# Patient Record
Sex: FEMALE | Race: WHITE | URBAN - METROPOLITAN AREA
[De-identification: names, ages, dates, MRNs, and addresses within clinical notes are randomized per-mention and may not be internally consistent; named-entity substitution may affect disease eponyms.]

---

## 2017-01-03 VITALS
RESPIRATION RATE: 18 BRPM | BODY MASS INDEX: 39.34 KG/M2 | HEIGHT: 63 IN | WEIGHT: 222 LBS | DIASTOLIC BLOOD PRESSURE: 80 MMHG | SYSTOLIC BLOOD PRESSURE: 150 MMHG | HEART RATE: 79 BPM | TEMPERATURE: 98.1 F | OXYGEN SATURATION: 95 %

## 2017-01-09 VITALS
HEIGHT: 63 IN | WEIGHT: 216 LBS | DIASTOLIC BLOOD PRESSURE: 70 MMHG | SYSTOLIC BLOOD PRESSURE: 130 MMHG | BODY MASS INDEX: 38.27 KG/M2 | OXYGEN SATURATION: 98 % | HEART RATE: 78 BPM | TEMPERATURE: 97.7 F | RESPIRATION RATE: 16 BRPM

## 2017-01-11 ENCOUNTER — OUTPATIENT (OUTPATIENT)
Dept: OUTPATIENT SERVICES | Facility: HOSPITAL | Age: 72
LOS: 1 days | Discharge: HOME | End: 2017-01-11
Payer: MEDICARE

## 2017-01-11 DIAGNOSIS — Z98.89 OTHER SPECIFIED POSTPROCEDURAL STATES: Chronic | ICD-10-CM

## 2017-01-11 DIAGNOSIS — N83.29 OTHER OVARIAN CYSTS: Chronic | ICD-10-CM

## 2017-01-11 PROCEDURE — 77280 THER RAD SIMULAJ FIELD SMPL: CPT | Mod: 26

## 2017-01-12 PROCEDURE — 77427 RADIATION TX MANAGEMENT X5: CPT

## 2017-01-13 PROCEDURE — 77331 SPECIAL RADIATION DOSIMETRY: CPT | Mod: 26

## 2017-01-13 PROCEDURE — 77427 RADIATION TX MANAGEMENT X5: CPT

## 2017-01-17 VITALS
BODY MASS INDEX: 39.34 KG/M2 | DIASTOLIC BLOOD PRESSURE: 83 MMHG | SYSTOLIC BLOOD PRESSURE: 143 MMHG | WEIGHT: 222 LBS | HEIGHT: 63 IN | RESPIRATION RATE: 16 BRPM | HEART RATE: 70 BPM | TEMPERATURE: 97.4 F

## 2017-01-17 VITALS — OXYGEN SATURATION: 98 %

## 2017-01-20 PROCEDURE — 77427 RADIATION TX MANAGEMENT X5: CPT

## 2017-01-24 ENCOUNTER — APPOINTMENT (OUTPATIENT)
Dept: INTERNAL MEDICINE | Facility: CLINIC | Age: 72
End: 2017-01-24

## 2017-01-26 VITALS
OXYGEN SATURATION: 98 % | DIASTOLIC BLOOD PRESSURE: 92 MMHG | HEART RATE: 70 BPM | SYSTOLIC BLOOD PRESSURE: 148 MMHG | RESPIRATION RATE: 16 BRPM

## 2017-03-02 ENCOUNTER — FORM ENCOUNTER (OUTPATIENT)
Age: 72
End: 2017-03-02

## 2017-03-03 ENCOUNTER — APPOINTMENT (OUTPATIENT)
Dept: RADIATION ONCOLOGY | Facility: CLINIC | Age: 72
End: 2017-03-03

## 2017-03-03 VITALS
OXYGEN SATURATION: 98 % | BODY MASS INDEX: 38.62 KG/M2 | WEIGHT: 218 LBS | DIASTOLIC BLOOD PRESSURE: 82 MMHG | HEART RATE: 84 BPM | HEIGHT: 63 IN | TEMPERATURE: 97.7 F | SYSTOLIC BLOOD PRESSURE: 158 MMHG | RESPIRATION RATE: 18 BRPM

## 2017-03-03 DIAGNOSIS — C51.9 MALIGNANT NEOPLASM OF VULVA, UNSPECIFIED: ICD-10-CM

## 2017-03-03 DIAGNOSIS — C80.1 MALIGNANT (PRIMARY) NEOPLASM, UNSPECIFIED: ICD-10-CM

## 2017-03-30 ENCOUNTER — APPOINTMENT (OUTPATIENT)
Dept: INTERNAL MEDICINE | Facility: CLINIC | Age: 72
End: 2017-03-30

## 2017-05-19 ENCOUNTER — APPOINTMENT (OUTPATIENT)
Dept: INTERNAL MEDICINE | Facility: CLINIC | Age: 72
End: 2017-05-19

## 2017-06-21 ENCOUNTER — APPOINTMENT (OUTPATIENT)
Dept: INTERNAL MEDICINE | Facility: CLINIC | Age: 72
End: 2017-06-21

## 2017-07-07 ENCOUNTER — APPOINTMENT (OUTPATIENT)
Dept: RADIATION ONCOLOGY | Facility: CLINIC | Age: 72
End: 2017-07-07

## 2017-07-24 ENCOUNTER — APPOINTMENT (OUTPATIENT)
Dept: INTERNAL MEDICINE | Facility: CLINIC | Age: 72
End: 2017-07-24

## 2017-08-01 ENCOUNTER — APPOINTMENT (OUTPATIENT)
Dept: INTERNAL MEDICINE | Facility: CLINIC | Age: 72
End: 2017-08-01
Payer: MEDICARE

## 2017-08-01 PROCEDURE — 36415 COLL VENOUS BLD VENIPUNCTURE: CPT

## 2017-08-01 PROCEDURE — 99214 OFFICE O/P EST MOD 30 MIN: CPT | Mod: 25

## 2017-08-08 ENCOUNTER — APPOINTMENT (OUTPATIENT)
Dept: INTERNAL MEDICINE | Facility: CLINIC | Age: 72
End: 2017-08-08
Payer: MEDICARE

## 2017-08-08 PROCEDURE — 36415 COLL VENOUS BLD VENIPUNCTURE: CPT

## 2017-08-08 PROCEDURE — 99214 OFFICE O/P EST MOD 30 MIN: CPT | Mod: 25

## 2017-09-05 ENCOUNTER — APPOINTMENT (OUTPATIENT)
Dept: INTERNAL MEDICINE | Facility: CLINIC | Age: 72
End: 2017-09-05
Payer: MEDICARE

## 2017-09-05 PROCEDURE — 36415 COLL VENOUS BLD VENIPUNCTURE: CPT

## 2017-09-05 PROCEDURE — G0008: CPT

## 2017-09-05 PROCEDURE — 90662 IIV NO PRSV INCREASED AG IM: CPT

## 2017-09-05 PROCEDURE — 99215 OFFICE O/P EST HI 40 MIN: CPT | Mod: 25

## 2017-09-14 ENCOUNTER — APPOINTMENT (OUTPATIENT)
Dept: INTERNAL MEDICINE | Facility: CLINIC | Age: 72
End: 2017-09-14
Payer: MEDICARE

## 2017-09-14 PROCEDURE — 99213 OFFICE O/P EST LOW 20 MIN: CPT

## 2017-09-20 ENCOUNTER — APPOINTMENT (OUTPATIENT)
Dept: INTERNAL MEDICINE | Facility: CLINIC | Age: 72
End: 2017-09-20
Payer: MEDICARE

## 2017-09-20 PROCEDURE — 99214 OFFICE O/P EST MOD 30 MIN: CPT | Mod: 25

## 2017-09-20 PROCEDURE — 36415 COLL VENOUS BLD VENIPUNCTURE: CPT

## 2017-11-08 ENCOUNTER — APPOINTMENT (OUTPATIENT)
Dept: INTERNAL MEDICINE | Facility: CLINIC | Age: 72
End: 2017-11-08
Payer: MEDICARE

## 2017-11-08 PROCEDURE — 36415 COLL VENOUS BLD VENIPUNCTURE: CPT

## 2017-11-08 PROCEDURE — 99214 OFFICE O/P EST MOD 30 MIN: CPT | Mod: 25

## 2017-11-28 ENCOUNTER — FORM ENCOUNTER (OUTPATIENT)
Age: 72
End: 2017-11-28

## 2017-11-29 ENCOUNTER — FORM ENCOUNTER (OUTPATIENT)
Age: 72
End: 2017-11-29

## 2017-11-30 ENCOUNTER — FORM ENCOUNTER (OUTPATIENT)
Age: 72
End: 2017-11-30

## 2017-11-30 ENCOUNTER — RESULT REVIEW (OUTPATIENT)
Age: 72
End: 2017-11-30

## 2017-12-07 ENCOUNTER — FORM ENCOUNTER (OUTPATIENT)
Age: 72
End: 2017-12-07

## 2018-01-15 ENCOUNTER — APPOINTMENT (OUTPATIENT)
Dept: INTERNAL MEDICINE | Facility: CLINIC | Age: 73
End: 2018-01-15
Payer: MEDICARE

## 2018-01-15 PROCEDURE — 99215 OFFICE O/P EST HI 40 MIN: CPT | Mod: 25

## 2018-01-15 PROCEDURE — 36415 COLL VENOUS BLD VENIPUNCTURE: CPT

## 2018-06-20 ENCOUNTER — FORM ENCOUNTER (OUTPATIENT)
Age: 73
End: 2018-06-20

## 2018-07-05 ENCOUNTER — FORM ENCOUNTER (OUTPATIENT)
Age: 73
End: 2018-07-05

## 2018-07-12 ENCOUNTER — RECORD ABSTRACTING (OUTPATIENT)
Age: 73
End: 2018-07-12

## 2018-07-12 DIAGNOSIS — Z87.19 PERSONAL HISTORY OF OTHER DISEASES OF THE DIGESTIVE SYSTEM: ICD-10-CM

## 2018-07-12 DIAGNOSIS — R29.898 OTHER SYMPTOMS AND SIGNS INVOLVING THE MUSCULOSKELETAL SYSTEM: ICD-10-CM

## 2018-07-12 DIAGNOSIS — Z86.39 PERSONAL HISTORY OF OTHER ENDOCRINE, NUTRITIONAL AND METABOLIC DISEASE: ICD-10-CM

## 2018-07-12 DIAGNOSIS — M87.052 IDIOPATHIC ASEPTIC NECROSIS OF LEFT FEMUR: ICD-10-CM

## 2018-07-12 DIAGNOSIS — E11.69 TYPE 2 DIABETES MELLITUS WITH OTHER SPECIFIED COMPLICATION: ICD-10-CM

## 2018-07-12 DIAGNOSIS — Z86.79 PERSONAL HISTORY OF OTHER DISEASES OF THE CIRCULATORY SYSTEM: ICD-10-CM

## 2018-07-12 DIAGNOSIS — Z87.39 PERSONAL HISTORY OF OTHER DISEASES OF THE MUSCULOSKELETAL SYSTEM AND CONNECTIVE TISSUE: ICD-10-CM

## 2018-07-12 DIAGNOSIS — R55 SYNCOPE AND COLLAPSE: ICD-10-CM

## 2018-07-12 DIAGNOSIS — E66.9 OBESITY, UNSPECIFIED: ICD-10-CM

## 2018-07-12 DIAGNOSIS — Z98.890 OTHER SPECIFIED POSTPROCEDURAL STATES: ICD-10-CM

## 2018-07-12 DIAGNOSIS — R26.89 OTHER ABNORMALITIES OF GAIT AND MOBILITY: ICD-10-CM

## 2018-07-12 RX ORDER — ASPIRIN 325 MG/1
325 TABLET, COATED ORAL DAILY
Refills: 0 | Status: ACTIVE | COMMUNITY

## 2018-07-12 RX ORDER — LEVETIRACETAM 750 MG/1
750 TABLET, FILM COATED ORAL TWICE DAILY
Refills: 0 | Status: ACTIVE | COMMUNITY

## 2018-07-12 RX ORDER — TEMOZOLOMIDE 100 MG/1
100 CAPSULE ORAL
Refills: 0 | Status: ACTIVE | COMMUNITY

## 2018-07-12 RX ORDER — TEMOZOLOMIDE 180 MG/1
180 CAPSULE ORAL
Refills: 0 | Status: ACTIVE | COMMUNITY

## 2018-07-24 ENCOUNTER — APPOINTMENT (OUTPATIENT)
Dept: INTERNAL MEDICINE | Facility: CLINIC | Age: 73
End: 2018-07-24
Payer: MEDICARE

## 2018-07-24 ENCOUNTER — LABORATORY RESULT (OUTPATIENT)
Age: 73
End: 2018-07-24

## 2018-07-24 VITALS
BODY MASS INDEX: 30.65 KG/M2 | SYSTOLIC BLOOD PRESSURE: 142 MMHG | DIASTOLIC BLOOD PRESSURE: 78 MMHG | HEIGHT: 63 IN | WEIGHT: 173 LBS

## 2018-07-24 PROCEDURE — 36415 COLL VENOUS BLD VENIPUNCTURE: CPT

## 2018-07-24 PROCEDURE — 99214 OFFICE O/P EST MOD 30 MIN: CPT | Mod: 25

## 2018-07-24 RX ORDER — ONDANSETRON 8 MG/1
8 TABLET ORAL
Refills: 0 | Status: ACTIVE | COMMUNITY

## 2018-07-24 RX ORDER — DULOXETINE HYDROCHLORIDE 60 MG/1
60 CAPSULE, DELAYED RELEASE ORAL
Refills: 0 | Status: DISCONTINUED | COMMUNITY
End: 2018-07-24

## 2018-07-24 RX ORDER — ONDANSETRON 8 MG/1
8 TABLET ORAL
Refills: 0 | Status: DISCONTINUED | COMMUNITY
End: 2018-07-24

## 2018-07-24 NOTE — HISTORY OF PRESENT ILLNESS
[Spouse] : spouse [FreeTextEntry1] : checking blood pressure...it was high last week\par glioblastoma\par dm f/up [de-identified] : Patient is status post whole brain radiation for glioblastoma. She is currently completing her last course of chemotherapy and is doing well. Her  states that her memory is improving and overall she is feeling well.\par \par She has not had any routine labs for her diabetes but her medications were reviewed and she is still on all medications. She denies any headaches nausea or vomiting. 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

## 2018-07-24 NOTE — PHYSICAL EXAM
[No Acute Distress] : no acute distress [Well Nourished] : well nourished [Well Developed] : well developed [Well-Appearing] : well-appearing [Normal Sclera/Conjunctiva] : normal sclera/conjunctiva [PERRL] : pupils equal round and reactive to light [EOMI] : extraocular movements intact [Normal Outer Ear/Nose] : the outer ears and nose were normal in appearance [Normal Oropharynx] : the oropharynx was normal [No JVD] : no jugular venous distention [Supple] : supple [No Lymphadenopathy] : no lymphadenopathy [Thyroid Normal, No Nodules] : the thyroid was normal and there were no nodules present [No Respiratory Distress] : no respiratory distress  [Clear to Auscultation] : lungs were clear to auscultation bilaterally [No Accessory Muscle Use] : no accessory muscle use [Regular Rhythm] : with a regular rhythm [Normal S1, S2] : normal S1 and S2 [No Murmur] : no murmur heard [No Carotid Bruits] : no carotid bruits [No Abdominal Bruit] : a ~M bruit was not heard ~T in the abdomen [No Varicosities] : no varicosities [Pedal Pulses Present] : the pedal pulses are present [No Edema] : there was no peripheral edema [No Extremity Clubbing/Cyanosis] : no extremity clubbing/cyanosis [No Palpable Aorta] : no palpable aorta [Soft] : abdomen soft [Non Tender] : non-tender [Non-distended] : non-distended [No Masses] : no abdominal mass palpated [No HSM] : no HSM [Normal Bowel Sounds] : normal bowel sounds [Normal Posterior Cervical Nodes] : no posterior cervical lymphadenopathy [Normal Anterior Cervical Nodes] : no anterior cervical lymphadenopathy [No CVA Tenderness] : no CVA  tenderness [No Spinal Tenderness] : no spinal tenderness [No Joint Swelling] : no joint swelling [Grossly Normal Strength/Tone] : grossly normal strength/tone [No Rash] : no rash [Normal Gait] : normal gait [Coordination Grossly Intact] : coordination grossly intact [No Focal Deficits] : no focal deficits [Deep Tendon Reflexes (DTR)] : deep tendon reflexes were 2+ and symmetric [Normal] : the deep tendon reflexes were normal [Short Term Intact] : short term memory impaired [Span Intact] : the attention span was normal [Normal Rate] : a normal rate

## 2018-07-24 NOTE — COUNSELING
[Weight management counseling provided] : Weight management [Behavioral health counseling provided] : behavioral health

## 2018-07-24 NOTE — REVIEW OF SYSTEMS
[Headache] : headache [Dizziness] : dizziness [Confusion] : confusion [Memory Loss] : memory loss [Negative] : Heme/Lymph [FreeTextEntry2] : poor memory but better

## 2018-07-24 NOTE — ASSESSMENT
[FreeTextEntry1] : Patient examined and adjustments to therapy for HBP [default value]All labs reviewed with patient as well. Review of systems and physical exam discussed with patient. Care plan for future followups discussed with patient. All issues of health for the current year discussed in depth with patient who was conversant and all relevant issues addressed.\par Results of current evaluation discussed with patient. Medications were reviewed and all relevant labs as well as a 1C level and glucose levels were discussed in depth with with patient. Further options for ideal control were discussed, long-term complications of diabetes and screening for complications were also discussed. Patient was conversant and all relevant questions were asked and answered.\par Patient significantly improved since last visit with improved memory improved short-term memory and more alert. Hopefully she will be in long-term remission from her glioblastoma. Routine laboratory data was obtained

## 2018-07-25 ENCOUNTER — RX CHANGE (OUTPATIENT)
Age: 73
End: 2018-07-25

## 2018-07-25 LAB
ALBUMIN SERPL ELPH-MCNC: 4.5 G/DL
ALP BLD-CCNC: 63 U/L
ALT SERPL-CCNC: 11 U/L
ANION GAP SERPL CALC-SCNC: 18 MMOL/L
AST SERPL-CCNC: 16 U/L
BASOPHILS # BLD AUTO: 0.01 K/UL
BASOPHILS NFR BLD AUTO: 0.2 %
BILIRUB SERPL-MCNC: 0.3 MG/DL
BUN SERPL-MCNC: 28 MG/DL
CALCIUM SERPL-MCNC: 10 MG/DL
CHLORIDE SERPL-SCNC: 106 MMOL/L
CHOLEST SERPL-MCNC: 259 MG/DL
CHOLEST/HDLC SERPL: 3.8 RATIO
CO2 SERPL-SCNC: 22 MMOL/L
CREAT SERPL-MCNC: 1.13 MG/DL
EOSINOPHIL # BLD AUTO: 0.13 K/UL
EOSINOPHIL NFR BLD AUTO: 2.7 %
GLUCOSE SERPL-MCNC: 90 MG/DL
HBA1C MFR BLD HPLC: 5.3 %
HCT VFR BLD CALC: 31.5 %
HDLC SERPL-MCNC: 69 MG/DL
HGB BLD-MCNC: 9.7 G/DL
IMM GRANULOCYTES NFR BLD AUTO: 0.2 %
LDLC SERPL CALC-MCNC: 155 MG/DL
LYMPHOCYTES # BLD AUTO: 0.44 K/UL
LYMPHOCYTES NFR BLD AUTO: 9.3 %
MAN DIFF?: NORMAL
MCHC RBC-ENTMCNC: 30 PG
MCHC RBC-ENTMCNC: 30.8 GM/DL
MCV RBC AUTO: 97.5 FL
MONOCYTES # BLD AUTO: 0.35 K/UL
MONOCYTES NFR BLD AUTO: 7.4 %
NEUTROPHILS # BLD AUTO: 3.81 K/UL
NEUTROPHILS NFR BLD AUTO: 80.2 %
PLATELET # BLD AUTO: 223 K/UL
POTASSIUM SERPL-SCNC: 4.6 MMOL/L
PROT SERPL-MCNC: 6.8 G/DL
RBC # BLD: 3.23 M/UL
RBC # FLD: 14.4 %
SODIUM SERPL-SCNC: 146 MMOL/L
TRIGL SERPL-MCNC: 174 MG/DL
WBC # FLD AUTO: 4.75 K/UL

## 2018-07-25 RX ORDER — METFORMIN HYDROCHLORIDE 500 MG/1
500 TABLET, COATED ORAL DAILY
Qty: 90 | Refills: 1 | Status: ACTIVE | COMMUNITY
Start: 1900-01-01 | End: 1900-01-01

## 2018-08-15 ENCOUNTER — MEDICATION RENEWAL (OUTPATIENT)
Age: 73
End: 2018-08-15

## 2018-11-05 ENCOUNTER — APPOINTMENT (OUTPATIENT)
Dept: INTERNAL MEDICINE | Facility: CLINIC | Age: 73
End: 2018-11-05
Payer: MEDICARE

## 2018-11-05 VITALS
WEIGHT: 179 LBS | SYSTOLIC BLOOD PRESSURE: 120 MMHG | HEIGHT: 63 IN | DIASTOLIC BLOOD PRESSURE: 70 MMHG | BODY MASS INDEX: 31.71 KG/M2

## 2018-11-05 DIAGNOSIS — Z23 ENCOUNTER FOR IMMUNIZATION: ICD-10-CM

## 2018-11-05 PROCEDURE — 36415 COLL VENOUS BLD VENIPUNCTURE: CPT

## 2018-11-05 PROCEDURE — G0008: CPT

## 2018-11-05 PROCEDURE — 99215 OFFICE O/P EST HI 40 MIN: CPT | Mod: 25

## 2018-11-05 PROCEDURE — 90662 IIV NO PRSV INCREASED AG IM: CPT

## 2018-11-05 NOTE — PLAN
[FreeTextEntry1] : Results of current evaluation discussed with patient. Medications were reviewed and all relevant labs as well as a 1C level and glucose levels were discussed in depth with  patient. Further options for ideal control were discussed, long-term complications of diabetes and screening for complications were also discussed. Patient was conversant and all relevant questions were asked and answered. Patient's last A1c was 5.3 and will be maintained on metformin\par Cholesterol levels discussed with patient. Compliance with oral medication were also addressed . Ideal LDL levels were  discussed with the patient. All issues regarding the implications of high cholesterol necessity for treatment were discussed with the patient who is conversant and all relevant questions were asked and answered. Last cholesterol was 259 the patient may be off statin  will check the labs will be repeated\par \par Patient examined and adjustments to therapy for HBP not need All labs reviewed with patient as well. Review of systems and physical exam discussed with patient. Care plan for future followups discussed with patient. All issues of health for the current year discussed in depth with patient who was conversant and all relevant issues addressed.\par \par I reviewed her last MRI report that shows a possibility of depression E. old resected tumor site. She is due to get a followup MRI later this month. I discussed with the  that if her confusion persists she must call her oncologist did have the MRI did move slowly. In addition I will check a Keppra level to rule out toxicity and electrolytes to rule out low sodium. I also discussed with patient the family whether any new treatment might be beneficial to her.\par \par All issues regarding patient's health and medical problems have been discussed. The patient understands and concurs with the treatment plan.

## 2018-11-05 NOTE — HISTORY OF PRESENT ILLNESS
[Spouse] : spouse [FreeTextEntry1] : check up, had a confusing morning. [de-identified] : MRI UNCLEAR AREA RESECTION - F/UP PENDING\par Patient comes here for routine followup flu vaccine but was confused this morning. According to her  she is usually at baseline state at this morning really wasn't aware of where she was slightly dizzy. There was no syncope, no seizure activity. Her last MRI showed possible recurrence of the glioblastoma. She is due to see her oncologist later this month\par

## 2018-11-05 NOTE — REVIEW OF SYSTEMS
[Headache] : headache [Dizziness] : dizziness [Confusion] : confusion [Memory Loss] : memory loss [Unsteady Walking] : ataxia [Negative] : Heme/Lymph [de-identified] : BASELINE BUT SLIGHTLY WORSE

## 2018-11-05 NOTE — PHYSICAL EXAM
[No Acute Distress] : no acute distress [Well Nourished] : well nourished [Well Developed] : well developed [Well-Appearing] : well-appearing [Normal Sclera/Conjunctiva] : normal sclera/conjunctiva [PERRL] : pupils equal round and reactive to light [EOMI] : extraocular movements intact [Normal Outer Ear/Nose] : the outer ears and nose were normal in appearance [Normal Oropharynx] : the oropharynx was normal [No JVD] : no jugular venous distention [Supple] : supple [No Lymphadenopathy] : no lymphadenopathy [Thyroid Normal, No Nodules] : the thyroid was normal and there were no nodules present [No Respiratory Distress] : no respiratory distress  [Clear to Auscultation] : lungs were clear to auscultation bilaterally [No Accessory Muscle Use] : no accessory muscle use [Normal Rate] : normal rate  [Regular Rhythm] : with a regular rhythm [Normal S1, S2] : normal S1 and S2 [No Murmur] : no murmur heard [No Carotid Bruits] : no carotid bruits [No Abdominal Bruit] : a ~M bruit was not heard ~T in the abdomen [No Varicosities] : no varicosities [Pedal Pulses Present] : the pedal pulses are present [No Edema] : there was no peripheral edema [No Extremity Clubbing/Cyanosis] : no extremity clubbing/cyanosis [No Palpable Aorta] : no palpable aorta [Soft] : abdomen soft [Non Tender] : non-tender [Non-distended] : non-distended [No Masses] : no abdominal mass palpated [No HSM] : no HSM [Normal Bowel Sounds] : normal bowel sounds [Normal Posterior Cervical Nodes] : no posterior cervical lymphadenopathy [Normal Anterior Cervical Nodes] : no anterior cervical lymphadenopathy [No CVA Tenderness] : no CVA  tenderness [No Spinal Tenderness] : no spinal tenderness [No Joint Swelling] : no joint swelling [Grossly Normal Strength/Tone] : grossly normal strength/tone [No Rash] : no rash [No Focal Deficits] : no focal deficits [Deep Tendon Reflexes (DTR)] : deep tendon reflexes were 2+ and symmetric [de-identified] : Patient not oriented to time year. She is aware of my name in her surroundings. She is unable to do any memory recall. No focal defects on neurologic examination

## 2018-11-06 LAB
ALBUMIN SERPL ELPH-MCNC: 4.1 G/DL
ALP BLD-CCNC: 73 U/L
ALT SERPL-CCNC: 12 U/L
ANION GAP SERPL CALC-SCNC: 16 MMOL/L
AST SERPL-CCNC: 16 U/L
BILIRUB SERPL-MCNC: 0.3 MG/DL
BUN SERPL-MCNC: 29 MG/DL
CALCIUM SERPL-MCNC: 9.9 MG/DL
CHLORIDE SERPL-SCNC: 104 MMOL/L
CHOLEST SERPL-MCNC: 276 MG/DL
CHOLEST/HDLC SERPL: 4.1 RATIO
CO2 SERPL-SCNC: 23 MMOL/L
CREAT SERPL-MCNC: 1.06 MG/DL
GLUCOSE SERPL-MCNC: 250 MG/DL
HBA1C MFR BLD HPLC: 6.6 %
HDLC SERPL-MCNC: 67 MG/DL
LDLC SERPL CALC-MCNC: 179 MG/DL
POTASSIUM SERPL-SCNC: 4.3 MMOL/L
PROT SERPL-MCNC: 6.5 G/DL
SODIUM SERPL-SCNC: 143 MMOL/L
TRIGL SERPL-MCNC: 151 MG/DL

## 2018-11-07 LAB — LEVETIRACETAM SERPL-MCNC: 22.3 MCG/ML

## 2018-11-19 ENCOUNTER — OTHER (OUTPATIENT)
Age: 73
End: 2018-11-19

## 2018-11-19 ENCOUNTER — APPOINTMENT (OUTPATIENT)
Dept: INTERNAL MEDICINE | Facility: CLINIC | Age: 73
End: 2018-11-19
Payer: MEDICARE

## 2018-11-19 VITALS
HEIGHT: 63 IN | DIASTOLIC BLOOD PRESSURE: 60 MMHG | BODY MASS INDEX: 31.71 KG/M2 | WEIGHT: 179 LBS | SYSTOLIC BLOOD PRESSURE: 120 MMHG

## 2018-11-19 DIAGNOSIS — R19.09 OTHER INTRA-ABDOMINAL AND PELVIC SWELLING, MASS AND LUMP: ICD-10-CM

## 2018-11-19 DIAGNOSIS — R41.3 OTHER AMNESIA: ICD-10-CM

## 2018-11-19 PROCEDURE — 99214 OFFICE O/P EST MOD 30 MIN: CPT

## 2018-11-19 NOTE — HISTORY OF PRESENT ILLNESS
[FreeTextEntry1] : Patient is here in followup due to neurological deterioration. She was seen here last visit it was final laboratory data was normal. Since then she said inability to ambulate independently and progressive loss of speech. Her recent MRI shows progression and she is awaiting an appointment with a neurosurgeon next week [de-identified] : Patient is confused and has had is hesitant speech. Neurologically the stable with weakness on the right side of her body.

## 2018-11-19 NOTE — PHYSICAL EXAM
[No Acute Distress] : no acute distress [Normal Sclera/Conjunctiva] : normal sclera/conjunctiva [Normal Outer Ear/Nose] : the outer ears and nose were normal in appearance [No JVD] : no jugular venous distention [No Respiratory Distress] : no respiratory distress  [Normal Rate] : normal rate  [No Carotid Bruits] : no carotid bruits [Normal Supraclavicular Nodes] : no supraclavicular lymphadenopathy [No Joint Swelling] : no joint swelling [de-identified] : Patient is confused and has had is hesitant speech. Patient has increased weakness on her right side and his speech is significantly reduced

## 2018-11-19 NOTE — HISTORY OF PRESENT ILLNESS
[FreeTextEntry1] : This is the third attempt of note to computer issues today. Patient is following up after her last visit with progressive neurological deterioration from an enlarging glioblastoma [de-identified] : Patient was seen several weeks ago because of progressive confusion. Since then she had an MRI that shows an expanding enhancing lesion in the left cerebrum. Since her last visit her speech has deteriorated her gait is poor she need ambulation help in ambulation out of the wheelchair. She is due to see her neurosurgeon next week

## 2018-11-19 NOTE — PLAN
[FreeTextEntry1] : Patient has evidence of progressive neurological deterioration the face of normal labs most likely due to her enlarging glioblastoma. She is due to see a neurosurgeon next week. In the interim I started her on high-dose Decadron in a tapering schedule with the family checking postprandial glucose levels and notify me for elevations.\par \par It is unclear if there is enough is swelling to respond to steroids considering her deterioration is clearly indicated.\par \par She has no evidence of Keppra toxicity and her last glucose was 250 with a normal A1 C. She might require postprandial insulin coverage due to the high-dose steroids. On discussion was with the family regarding home aides and the importance of postprandial glucose checking and the need to followup and call to get on insulin if needed.\par \par Patient's overall prognosis is poor but I will defer treatment to her neurologic neurosurgeon and oncologist. The family is quite aware of her diagnosis and the poor prognosis.\par \par All issues regarding patient's health and medical problems have been discussed. The patient understands and concurs with the treatment plan.

## 2018-11-19 NOTE — REVIEW OF SYSTEMS
[Fatigue] : fatigue [Headache] : headache [Dizziness] : dizziness [Confusion] : confusion [Memory Loss] : memory loss [Unsteady Walking] : ataxia [Negative] : Integumentary

## 2018-11-19 NOTE — PHYSICAL EXAM
[No Acute Distress] : no acute distress [Normal Sclera/Conjunctiva] : normal sclera/conjunctiva [Normal Outer Ear/Nose] : the outer ears and nose were normal in appearance [No JVD] : no jugular venous distention [No Respiratory Distress] : no respiratory distress  [Normal Rate] : normal rate  [No Carotid Bruits] : no carotid bruits [Normal Supraclavicular Nodes] : no supraclavicular lymphadenopathy [No Joint Swelling] : no joint swelling [de-identified] : Patient is confused and has had is hesitant speech. Neurologically the stable with weakness on the right side of her body.

## 2018-11-19 NOTE — PHYSICAL EXAM
[No Acute Distress] : no acute distress [Normal Sclera/Conjunctiva] : normal sclera/conjunctiva [Normal Outer Ear/Nose] : the outer ears and nose were normal in appearance [No JVD] : no jugular venous distention [No Respiratory Distress] : no respiratory distress  [Normal Rate] : normal rate  [No Carotid Bruits] : no carotid bruits [Normal Supraclavicular Nodes] : no supraclavicular lymphadenopathy [No Joint Swelling] : no joint swelling [de-identified] : Patient is confused and has had is hesitant speech. Neurologically the stable with weakness on the right side of her body.

## 2018-11-19 NOTE — HISTORY OF PRESENT ILLNESS
[FreeTextEntry1] : Patient is here in followup due to neurological deterioration. She was seen here last visit it was final laboratory data was normal. Since then she said inability to ambulate independently and progressive loss of speech. Her recent MRI shows progression and she is awaiting an appointment with a neurosurgeon next week [de-identified] : Patient is confused and has had is hesitant speech. Neurologically the stable with weakness on the right side of her body.

## 2018-11-26 ENCOUNTER — APPOINTMENT (OUTPATIENT)
Dept: INTERNAL MEDICINE | Facility: CLINIC | Age: 73
End: 2018-11-26
Payer: MEDICARE

## 2018-11-26 VITALS
BODY MASS INDEX: 31.71 KG/M2 | HEIGHT: 63 IN | DIASTOLIC BLOOD PRESSURE: 80 MMHG | WEIGHT: 179 LBS | SYSTOLIC BLOOD PRESSURE: 130 MMHG

## 2018-11-26 PROCEDURE — 99214 OFFICE O/P EST MOD 30 MIN: CPT

## 2018-11-26 NOTE — REVIEW OF SYSTEMS
[Fatigue] : fatigue [Headache] : headache [Dizziness] : dizziness [Confusion] : confusion [Memory Loss] : memory loss [Unsteady Walking] : ataxia [Negative] : Integumentary [FreeTextEntry2] : Patient feels more alert

## 2018-11-26 NOTE — PLAN
[FreeTextEntry1] : Patient is doing significantly better after reinstitution of high-dose Decadron. She is more alert and conversant in moving her right side. I have lowered the dose to 4 mg twice a day until she sees her neurologist in 3 days. This was all discussed with the family in great detail\par \par Results of current evaluation discussed with patient. Medications were reviewed and all relevant labs as well as a 1C level and glucose levels were discussed in depth with  patient. Further options for ideal control were discussed, long-term complications of diabetes and screening for complications were also discussed. Patient was conversant and all relevant questions were asked and answered. Patient may need reinstitution of Lantus and the  will go home and check to see if his Lantus to Levemir. Glucose has been elevated at home between 204 100 and this was discussed in detail with them. Most likely she will be restarted on medication once a get the details and the family\par \par \par Neurologically patient is stable but unfortunately her illness is progressing. Her blood pressure is unchanged on steroids and does not need alteration. Her ultimate prognosis is quite poor.\par All issues regarding patient's health and medical problems have been discussed. The patient understands and concurs with the treatment plan.

## 2018-11-26 NOTE — HISTORY OF PRESENT ILLNESS
[FreeTextEntry1] : f/up to prednisone and medical issues [de-identified] : Patient is here in followup after instituting high-dose Decadron due to her deteriorating mental status. This is based on prior MRI reports that showed significant swelling. Family states she shown dramatic improvement with reduced lethargy and increased alertness and inability to move her right side. On the down side her glucose numbers have risen and she may need to be reinstituted on Lantus

## 2018-11-26 NOTE — PHYSICAL EXAM
[No Acute Distress] : no acute distress [Well-Appearing] : well-appearing [Normal Sclera/Conjunctiva] : normal sclera/conjunctiva [Normal Outer Ear/Nose] : the outer ears and nose were normal in appearance [No JVD] : no jugular venous distention [No Respiratory Distress] : no respiratory distress  [Normal Rate] : normal rate  [No Carotid Bruits] : no carotid bruits [Normal Supraclavicular Nodes] : no supraclavicular lymphadenopathy [No Joint Swelling] : no joint swelling [de-identified] : Patient is more alert and responsive although still confused. She is able to answer questions and has significant increased use of her right arm and leg compared with last visit. Her level of wakefulness is significantly improved [de-identified] : Unchanged

## 2018-11-27 ENCOUNTER — TRANSCRIPTION ENCOUNTER (OUTPATIENT)
Age: 73
End: 2018-11-27

## 2018-11-28 RX ORDER — ELECTROLYTES/DEXTROSE
32G X 4 MM SOLUTION, ORAL ORAL
Qty: 90 | Refills: 1 | Status: ACTIVE | COMMUNITY
Start: 2018-11-27 | End: 1900-01-01

## 2018-12-17 ENCOUNTER — APPOINTMENT (OUTPATIENT)
Dept: INTERNAL MEDICINE | Facility: CLINIC | Age: 73
End: 2018-12-17
Payer: MEDICARE

## 2018-12-17 VITALS
WEIGHT: 179 LBS | HEIGHT: 63 IN | SYSTOLIC BLOOD PRESSURE: 160 MMHG | DIASTOLIC BLOOD PRESSURE: 90 MMHG | BODY MASS INDEX: 31.71 KG/M2

## 2018-12-17 DIAGNOSIS — R56.9 UNSPECIFIED CONVULSIONS: ICD-10-CM

## 2018-12-17 DIAGNOSIS — I13.10 HYPERTENSIVE HEART AND CHRONIC KIDNEY DISEASE W/OUT HEART FAILURE, WITH STAGE 1 THROUGH STAGE 4 CHRONIC KIDNEY DISEASE, OR UNSPECIFIED CHRONIC KIDNEY DISEASE: ICD-10-CM

## 2018-12-17 DIAGNOSIS — E11.49 TYPE 2 DIABETES MELLITUS WITH OTHER DIABETIC NEUROLOGICAL COMPLICATION: ICD-10-CM

## 2018-12-17 DIAGNOSIS — I10 ESSENTIAL (PRIMARY) HYPERTENSION: ICD-10-CM

## 2018-12-17 PROCEDURE — 99215 OFFICE O/P EST HI 40 MIN: CPT

## 2018-12-17 RX ORDER — IRBESARTAN 150 MG/1
150 TABLET ORAL
Refills: 0 | Status: ACTIVE | COMMUNITY

## 2018-12-17 RX ORDER — AMLODIPINE BESYLATE 10 MG/1
10 TABLET ORAL DAILY
Qty: 90 | Refills: 1 | Status: ACTIVE | COMMUNITY
Start: 2018-12-17 | End: 1900-01-01

## 2018-12-17 RX ORDER — CELECOXIB 100 MG/1
100 CAPSULE ORAL
Qty: 60 | Refills: 0 | Status: ACTIVE | COMMUNITY
Start: 2018-09-10

## 2018-12-17 NOTE — PHYSICAL EXAM
[Ill-Appearing] : ill-appearing [Normal Sclera/Conjunctiva] : normal sclera/conjunctiva [Normal Outer Ear/Nose] : the outer ears and nose were normal in appearance [No JVD] : no jugular venous distention [No Respiratory Distress] : no respiratory distress  [Normal Rate] : normal rate  [No Carotid Bruits] : no carotid bruits [No Edema] : there was no peripheral edema [Soft] : abdomen soft [No Joint Swelling] : no joint swelling [Speech Grossly Normal] : speech grossly normal [de-identified] : Patient has significant difficulty understanding and slow in response. He is hemiparetic on the right side but slightly improved since last visit. She lacks significant understanding of questions asked

## 2018-12-17 NOTE — ASSESSMENT
[FreeTextEntry1] : Patient examined and adjustments to therapy for HBP was required due to a blood pressure 190/105 today. Enamlodipine 10 mg is added to her regimen and she will followup in one week All labs reviewed with patient as well. Review of systems and physical exam discussed with patient. Care plan for future followups discussed with patient. All issues of health for the current year discussed in depth with patient who was conversant and all relevant issues addressed.\par \par Results of current evaluation discussed with patient. Medications were reviewed and all relevant labs as well as a 1C level and glucose levels were discussed in depth with  patient. Further options for ideal control were discussed, long-term complications of diabetes and screening for complications were also discussed. Patient was conversant and all relevant questions were asked and answered. Tresiba was increased to 30 units a day due to markedly elevated glucoses. The family will be in touch with me over the health portal every 2-3 days for adjustments. This was discussed extensively with the family\par \par \par Patient's poor mental status is due to significant edema to her astrocytoma. Her prognosis is guarded and she failed to respond to current chemotherapy they may not be many options but I will defer that to oncology.\par \par All issues regarding patient's health and medical problems have been discussed. The patient understands and concurs with the treatment plan. Approximately 45 minutes was spent with the patient and the family including history physical decision making in discussion with

## 2018-12-27 ENCOUNTER — APPOINTMENT (OUTPATIENT)
Dept: INTERNAL MEDICINE | Facility: CLINIC | Age: 73
End: 2018-12-27
Payer: MEDICARE

## 2018-12-27 VITALS
WEIGHT: 179 LBS | DIASTOLIC BLOOD PRESSURE: 80 MMHG | HEIGHT: 63 IN | SYSTOLIC BLOOD PRESSURE: 120 MMHG | BODY MASS INDEX: 31.71 KG/M2

## 2018-12-27 DIAGNOSIS — E78.00 PURE HYPERCHOLESTEROLEMIA, UNSPECIFIED: ICD-10-CM

## 2018-12-27 DIAGNOSIS — C71.9 MALIGNANT NEOPLASM OF BRAIN, UNSPECIFIED: ICD-10-CM

## 2018-12-27 DIAGNOSIS — E11.65 TYPE 2 DIABETES MELLITUS WITH HYPERGLYCEMIA: ICD-10-CM

## 2018-12-27 DIAGNOSIS — I10 ESSENTIAL (PRIMARY) HYPERTENSION: ICD-10-CM

## 2018-12-27 DIAGNOSIS — T50.905S ADVERSE EFFECT OF UNSPECIFIED DRUGS, MEDICAMENTS AND BIOLOGICAL SUBSTANCES, SEQUELA: ICD-10-CM

## 2018-12-27 PROCEDURE — 36415 COLL VENOUS BLD VENIPUNCTURE: CPT

## 2018-12-27 PROCEDURE — 99214 OFFICE O/P EST MOD 30 MIN: CPT | Mod: 25

## 2018-12-27 RX ORDER — DEXAMETHASONE 4 MG/1
4 TABLET ORAL DAILY
Refills: 0 | Status: COMPLETED | COMMUNITY
Start: 2018-11-19 | End: 2018-12-27

## 2018-12-27 RX ORDER — OXYCODONE 5 MG/1
5 TABLET ORAL TWICE DAILY
Refills: 0 | Status: ACTIVE | COMMUNITY

## 2018-12-27 RX ORDER — BEVACIZUMAB 400 MG/16ML
400 INJECTION, SOLUTION INTRAVENOUS
Refills: 0 | Status: ACTIVE | COMMUNITY

## 2018-12-27 RX ORDER — DEXAMETHASONE 4 MG/1
4 TABLET ORAL
Refills: 0 | Status: ACTIVE | COMMUNITY

## 2018-12-27 NOTE — ASSESSMENT
[FreeTextEntry1] : Results of current evaluation discussed with patient. Medications were reviewed and all relevant labs as well as a 1C level and glucose levels were discussed in depth with  patient. Further options for ideal control were discussed, long-term complications of diabetes and screening for complications were also discussed. Patient was conversant and all relevant questions were asked and answered. Tresiba was increased to 45 units with serial monitoring. My goal is to keep levels in the mid 200 range and not any lower.\par \par Patient examined and adjustments to therapy for HBP [default value]All labs reviewed with patient as well. Review of systems and physical exam discussed with patient. Care plan for future followups discussed with patient. All issues of health for the current year discussed in depth with patient who was conversant and all relevant issues addressed. Blood pressure taken today by myself was 150/80 on her current regimen. I will defer addition of any new medications especially diuretics pending reevaluation this until this is a reasonable blood pressure at this point. Patient also swelling of the right lower extremity that had a DVT ruled out by sonogram at her oncologist.\par \par I had a long conversation with patient's  and son in the room regarding her ultimate prognosis and the difficulty in managing her diabetes and hypertension. The current time Matt is being aggressive in treatment of her malignant astrocytoma with Avastin and Decadron. If she does not respond in further conversations will be needed regarding the aggressiveness of care.\par \par All issues regarding patient's health and medical problems have been discussed. The familyunderstands and concurs with the treatment plan.

## 2018-12-27 NOTE — REVIEW OF SYSTEMS
[Fatigue] : fatigue [Headache] : headache [Dizziness] : dizziness [Confusion] : confusion [Memory Loss] : memory loss [Unsteady Walking] : ataxia [Negative] : Integumentary [FreeTextEntry2] : Patient feels more alert [de-identified] : weak r

## 2018-12-27 NOTE — HISTORY OF PRESENT ILLNESS
[FreeTextEntry1] : LEG SWELLING AND NEW MEDS [de-identified] : Patient was recently evaluated by her oncologist and due to progressive weakness on the right side Decadron was increased to 8 mg twice a day. Her glucose levels as taken by her son have increased significantly for 3-400 range. Last visit she has significant hypertension his application of Avastin and amlodipine was increased to 10. Blood pressures taken at home still are elevated. Neurologically patient is stable and not particularly oriented and still weak on the right side

## 2018-12-28 LAB
ALBUMIN SERPL ELPH-MCNC: 3.4 G/DL
ALP BLD-CCNC: 67 U/L
ALT SERPL-CCNC: 66 U/L
ANION GAP SERPL CALC-SCNC: 12 MMOL/L
AST SERPL-CCNC: 27 U/L
BILIRUB SERPL-MCNC: 0.2 MG/DL
BUN SERPL-MCNC: 42 MG/DL
CALCIUM SERPL-MCNC: 8.8 MG/DL
CHLORIDE SERPL-SCNC: 104 MMOL/L
CO2 SERPL-SCNC: 22 MMOL/L
CREAT SERPL-MCNC: 1 MG/DL
GLUCOSE SERPL-MCNC: 398 MG/DL
POTASSIUM SERPL-SCNC: 4.4 MMOL/L
PROT SERPL-MCNC: 5.3 G/DL
SODIUM SERPL-SCNC: 138 MMOL/L

## 2018-12-31 ENCOUNTER — MEDICATION RENEWAL (OUTPATIENT)
Age: 73
End: 2018-12-31

## 2018-12-31 RX ORDER — INSULIN DEGLUDEC INJECTION 100 U/ML
100 INJECTION, SOLUTION SUBCUTANEOUS DAILY
Qty: 3 | Refills: 1 | Status: ACTIVE | COMMUNITY
Start: 2018-11-27 | End: 1900-01-01

## 2019-01-03 ENCOUNTER — FORM ENCOUNTER (OUTPATIENT)
Age: 74
End: 2019-01-03

## 2019-01-04 ENCOUNTER — APPOINTMENT (OUTPATIENT)
Dept: INTERNAL MEDICINE | Facility: CLINIC | Age: 74
End: 2019-01-04

## 2019-01-09 ENCOUNTER — APPOINTMENT (OUTPATIENT)
Dept: GYNECOLOGIC ONCOLOGY | Facility: CLINIC | Age: 74
End: 2019-01-09